# Patient Record
Sex: FEMALE | Race: WHITE
[De-identification: names, ages, dates, MRNs, and addresses within clinical notes are randomized per-mention and may not be internally consistent; named-entity substitution may affect disease eponyms.]

---

## 2017-01-16 NOTE — DX
Chest, PA Upright and Lateral Views (with ClearRead Bone Suppression Algorithm Image) - January 16, 2
017

 

Clinical History: 76-year-old female with atypical atrial flutter, shortness of breath, and chest alisha
n.

 

ICD-10 Diagnostic Codes: I48.4, R06.02, and R07.9.

 

Comparison Studies: Chest CT and conventional radiographic imaging, dated November 4, 2016.

 

Findings: The cardiac silhouette remains enlarged. There is peribronchial thickening and mild pulmona
ry venous hypertension. There is no pleural effusion, peripheral interstitial edema, or pneumothorax.
 There is some minimal diskoid subsegmental atelectasis in the lateral left midlung. The trachea is m
idline. The osseous structures are age-appropriate.

 

Impression:

 

1. Persistent cardiomegaly with peribronchial thickening and mild pulmonary venous hypertension.

2. Diskoid subsegmental atelectasis, lateral left midlung.

## 2017-03-06 ENCOUNTER — HOSPITAL ENCOUNTER (OUTPATIENT)
Dept: HOSPITAL 80 - BMCIMAGING | Age: 77
End: 2017-03-06
Attending: INTERNAL MEDICINE
Payer: COMMERCIAL

## 2017-03-06 DIAGNOSIS — K76.0: ICD-10-CM

## 2017-03-06 DIAGNOSIS — R93.2: Primary | ICD-10-CM

## 2017-03-20 ENCOUNTER — HOSPITAL ENCOUNTER (OUTPATIENT)
Dept: HOSPITAL 80 - FIMAGING | Age: 77
End: 2017-03-20
Attending: INTERNAL MEDICINE
Payer: COMMERCIAL

## 2017-03-20 DIAGNOSIS — D18.03: Primary | ICD-10-CM

## 2017-03-20 DIAGNOSIS — I70.0: ICD-10-CM

## 2017-03-20 DIAGNOSIS — K44.9: ICD-10-CM

## 2017-03-20 DIAGNOSIS — D73.89: ICD-10-CM

## 2019-01-11 ENCOUNTER — HOSPITAL ENCOUNTER (EMERGENCY)
Dept: HOSPITAL 80 - FED | Age: 79
Discharge: HOME | End: 2019-01-11
Payer: COMMERCIAL

## 2019-01-11 VITALS — DIASTOLIC BLOOD PRESSURE: 69 MMHG | SYSTOLIC BLOOD PRESSURE: 124 MMHG

## 2019-01-11 DIAGNOSIS — M79.602: ICD-10-CM

## 2019-01-11 DIAGNOSIS — R07.89: Primary | ICD-10-CM

## 2019-01-11 DIAGNOSIS — M79.601: ICD-10-CM

## 2019-01-11 LAB — PLATELET # BLD: 323 10^3/UL (ref 150–400)

## 2019-01-11 NOTE — CPEKG
Test Reason : OPEN

Blood Pressure : ***/*** mmHG

Vent. Rate : 085 BPM     Atrial Rate : 090 BPM

   P-R Int : 160 ms          QRS Dur : 085 ms

    QT Int : 367 ms       P-R-T Axes : 000 -24 014 degrees

   QTc Int : 437 ms

 

Atrial fibrillation

Inferior infarct, old

 

Confirmed by Wilda Brooks ([id number]) on 1/11/2019 4:37:18 PM

 

Referred By:             Confirmed By:Wilda Brooks

## 2019-01-11 NOTE — EDPHY
H & P


Stated Complaint: CP, GORAN arm pain


Time Seen by Provider: 01/11/19 10:32


HPI/ROS: 





CHIEF COMPLAINT:  Arm pain and chest pressure





HISTORY OF PRESENT ILLNESS:  This is a 78-year-old female with a history of 

atrial fibrillation on Eliquis and remote history of a gastric ulcer/Mcclendon's 

esophagus on Prilosec.  She comes in today after neck ex after experiencing an 

episode yesterday, over 12 hr ago, of arm pain and chest pressure.  This 

occurred while she was driving.  She states that she initially noticed some 

tingling followed by pain in her right arm and then the same in her left arm.  

This was followed by a feeling of chest "compression".  She felt slightly dizzy 

and had fuzzy vision.  She pulled over and put her head down until she felt 

better.  She did not faint.  She did not feel short of breath.  She does not 

recall being diaphoretic.  The entire episode lasted around 20 min.  She has 

had no recurrence.  She has never experienced anything like this before.  Her 

cardiologist is Dr. Conn.  She had a nuclear stress test about a year ago, 

reportedly normal.





REVIEW OF SYSTEMS:


A ten system review of systems was performed and is negative with the exception 

of the items mentioned in the HPI.  She also states that she has a low-grade 

headache most of the time.  There has been no recent change in this.  She 

occasionally has abdominal pain, none currently.





Past medical history:


1. Atrial fibrillation on Eliquis


2. Gastric ulcer and Mcclendon's esophagitis for which she takes Prilosec.  She 

tells me that both of these have resolved





Past surgical history:


1. Tendon release on all 10 digits of her hands


2. Hysterectomy





Family history:  Noncontributory





Social history:  She lives with her  who has Alzheimer's.  She does not 

use tobacco or alcohol products.  She is a retired .





General Appearance:  Alert.  Vital signs reviewed.  


Eyes:  Pupils equal and round, no conjunctival injection, no discharge. 

Anicteric.


ENT, Mouth:  Mucous membranes are moist, no oropharyngeal erythema or edema.


Neck:  No lymphadenopathy, supple.No midline neck pain.


Respiratory:  Lungs are clear to auscultation; no wheezes, rales, or rhonchi.


Cardiovascular:  Regular rate and rhythm; no murmur, rub, or gallop.


Gastrointestinal:  Abdomen is soft and nontender, no masses or organomegaly, 

bowel sounds normal.


Skin:  Warm and dry, no rashes on exposed skin, normal color.


Back:  Nontender to palpation over the thoracolumbar spine. No CVAT.


Extremities:  No lower extremity edema, no calf tenderness or swelling.


Neurological:  Alert and oriented.  Moving all four extremities easily and 

equally.


Psychiatric:  Normal affect.





- Personal History


Current Tetanus/Diphtheria Vaccine: Unsure


Current Tetanus Diphtheria and Acellular Pertussis (TDAP): Unsure





- Medical/Surgical History


Hx Asthma: No


Hx Chronic Respiratory Disease: No


Hx Diabetes: No


Hx Cardiac Disease: Yes


Hx Renal Disease: No


Hx Cirrhosis: No


Hx Alcoholism: No


Hx HIV/AIDS: No


Hx Splenectomy or Spleen Trauma: No


Other PMH: A-Fib, cardioconversion,





- Social History


Smoking Status: Never smoked


Constitutional: 


 Initial Vital Signs











Temperature (C)  36.8 C   01/11/19 09:33


 


Heart Rate  91   01/11/19 09:33


 


Respiratory Rate  16   01/11/19 09:33


 


Blood Pressure  134/98 H  01/11/19 09:33


 


O2 Sat (%)  93   01/11/19 09:33








 











O2 Delivery Mode               Room Air














Allergies/Adverse Reactions: 


 





Nitrofuran Analogues Allergy (Verified 01/11/19 09:31)


 


nitrofurantoin Allergy (Verified 01/11/19 09:31)


 


Sulfa (Sulfonamide Antibiotics) Allergy (Verified 01/11/19 09:31)


 


torsemide Allergy (Verified 01/11/19 09:31)


 








Home Medications: 














 Medication  Instructions  Recorded


 


IMITREX  11/01/16


 


Omeprazole  11/01/16


 


VAGIFEM  11/01/16


 


Eliquis  12/27/16


 


Lasix  12/27/16


 


Allegra Allergy  01/11/19


 


Digoxin  01/11/19


 


Fioricet (*)  01/11/19


 


Flonase Allergy Relief  01/11/19


 


Hyoscyamine Sulfate  01/11/19


 


Melatonin  01/11/19


 


Metoprolol Tartrate  01/11/19














Medical Decision Making





- Diagnostics


EKG Interpretation: 





12 lead EKG is interpreted in [place] by emergency department physician.  2 EKGs 

were performed and reviewed by me.  I have compared them to a past EKG.  Note 

significant changes.  Her EKG show atrial fibrillation with a rate between 77 

and 85. She does have inferior Q-waves which have been seen previously.  


ED Course/Re-evaluation: 





78-year-old female with a history of atrial fibrillation and remote history of 

gastric ulcer who presents with bilateral arm pain and chest heaviness that she 

experienced yesterday.  This has not recurred.  Evaluation in the emergency 

department included 2 serial EKGs, 2 serial troponins (both normal), CBC, 

chemistries, urinalysis, and chest x-ray.  Chest x-ray shows mild cardiomegaly, 

otherwise normal.  No evidence of pulmonary infection.  I do not suspect PE.





Initial blood pressure was 134/98 with improvement in repeat readings.





I spoke with the patient about the best approach in this setting.  At best, she 

has a HEART score of three (moderately suspicious story, age over 65), putting 

her at low risk for major coronary event over the next 6 weeks.  Previous EKGs 

have shown inferior Q-waves, suggestive of previous infarct.  She is not aware 

of ever having had a heart attack.  If, in fact, she did have a previous MI 

this would bump up her HEART score and put her into the moderate risk range.  

She is aware of this.  She and I discussed hospitalization.  She was offered 

hospitalization but declines.  She understands the importance of follow-up with 

Cardiology.  She also understands the danger signs that should prompt her to be 

re-evaluated immediately (she will call 911 should she have recurrent symptoms)

.  She understands that the cause of yesterday's episode is not understood.


Differential Diagnosis: 





I considered a differential diagnosis including but not limited to myocardial 

ischemia, pulmonary embolus, chest wall pain, pleural inflammation and 

pulmonary infectious causes.





- Data Points


Laboratory Results: 


 Laboratory Results





 01/11/19 09:43 





 01/11/19 09:43 








Point of Care Test Results: 


 Chemistry











  01/11/19 01/11/19





  13:59 09:47


 


POC Troponin I  0.03 ng/mL ng/mL  0.02 ng/mL ng/mL





   (0.00-0.08)   (0.00-0.08) 














Departure





- Departure


Disposition: Home, Routine, Self-Care


Clinical Impression: 


Chest pain


Qualifiers:


 Chest pain type: other chest pain Qualified Code(s): R07.89 - Other chest pain

; R07.8 - Other chest pain





Condition: Good


Instructions:  Chest Pain (ED)


Additional Instructions: 


If you have another episode of arm pain, chest pressure/heaviness/pain, 

shortness of breath, lightheadedness or feeling as if he might faint, call 911. 





Follow up with Cardiology next week.


Referrals: 


Radha Jordan MD [Primary Care Provider] - As per Instructions


Cash Conn MD [Medical Doctor] - As per Instructions

## 2019-01-11 NOTE — CPEKG
Test Reason : OPEN

Blood Pressure : ***/*** mmHG

Vent. Rate : 077 BPM     Atrial Rate : 138 BPM

   P-R Int : 068 ms          QRS Dur : 082 ms

    QT Int : 370 ms       P-R-T Axes : 000 -31 -04 degrees

   QTc Int : 419 ms

 

Atrial fibrillation

Inferior infarct, age indeterminate

 

Confirmed by Wilda Brooks ([id number]) on 1/11/2019 4:38:13 PM

 

Referred By:             Confirmed By:Wilda Brooks